# Patient Record
Sex: FEMALE | Race: WHITE | URBAN - METROPOLITAN AREA
[De-identification: names, ages, dates, MRNs, and addresses within clinical notes are randomized per-mention and may not be internally consistent; named-entity substitution may affect disease eponyms.]

---

## 2017-01-10 ENCOUNTER — TRANSFERRED RECORDS (OUTPATIENT)
Dept: HEALTH INFORMATION MANAGEMENT | Facility: CLINIC | Age: 62
End: 2017-01-10

## 2017-01-12 ENCOUNTER — OFFICE VISIT (OUTPATIENT)
Dept: OTOLARYNGOLOGY | Facility: CLINIC | Age: 62
End: 2017-01-12
Payer: COMMERCIAL

## 2017-01-12 ENCOUNTER — TELEPHONE (OUTPATIENT)
Dept: OTOLARYNGOLOGY | Facility: CLINIC | Age: 62
End: 2017-01-12

## 2017-01-12 DIAGNOSIS — M95.0 MOHS DEFECT OF NOSE: Primary | ICD-10-CM

## 2017-01-12 DIAGNOSIS — Z98.890 MOHS DEFECT OF NOSE: Primary | ICD-10-CM

## 2017-01-12 PROCEDURE — 99204 OFFICE O/P NEW MOD 45 MIN: CPT | Performed by: OTOLARYNGOLOGY

## 2017-01-12 RX ORDER — CALCIUM CARBONATE 500(1250)
500 TABLET ORAL 2 TIMES DAILY
COMMUNITY

## 2017-01-12 RX ORDER — CLINDAMYCIN HCL 300 MG
300 CAPSULE ORAL
Refills: 0 | Status: ON HOLD | COMMUNITY
Start: 2017-01-06 | End: 2017-01-25

## 2017-01-12 RX ORDER — SODIUM PHOSPHATE,MONO-DIBASIC 19G-7G/118
1 ENEMA (ML) RECTAL DAILY
COMMUNITY

## 2017-01-12 ASSESSMENT — PAIN SCALES - GENERAL: PAINLEVEL: NO PAIN (0)

## 2017-01-12 NOTE — PROGRESS NOTES
"Spenser Melendez MD  [facility]  [address], [address]  [place] [address]    Dear Doctor Al,    Thank you for asking me to see your patient, Ms. Reyna Escudero, in consultation to evaluate her right nasal defect after Mohs surgery.  Today I had the pleasure of seeing her at the Facial Plastic and Reconstructive Surgery Clinic in the Department of Otolaryngology at [facility].    CLINICAL SUMMARY:   Diagnoses:  Right nasal  defect from basal cell carcinoma, s/p Mohs surgery by Dr. Melendez.     Comorbidities: Joint replacements abx prophylactic, nausea after surgery  Pertinent medications: None  Photographs: [facility] consents signed January 12, 2017.   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   Care Checklist:   _Needs antibiotics prior to surgery because of joint replacement.  _Prescribe zofran postoperatively because of prior nausea after surgery.   _Schedule for stage 1 right nasal reconstruction with wound preparation, skin graft from either neck or postauricular skin.       MEDICAL DECISION MAKING:      Nasal defect after Mohs surgery. The reconstructive options of healing by secondary intention versus skin grafting versus local flap reconstruction were described in detail.  After carefully considering the options, she wishes to undergo skin grafting. We have initiated surgery scheduling. She did not find the aesthetic result from healing by secondary intention, nor the time needed to fully heal a wound secondarily to be acceptable. She also thought that the scars from a bilobe flap or forehead flap were too extensive. We have initiated surgery scheduling.    It has been a pleasure to participate in the care of Ms. Escudero.  Thank you for this kind referral.     Sincerely,    Robbie Velez MD    Division of Facial Plastic and Reconstructive Surgery,   Department of Otolaryngology  [facility] "   ____________________________________________________          HISTORY OF PRESENT ILLNESS and SKIN CANCER QUESTIONAAIRE:  As you know, Ms. Escudero is an61 year old-year-old female who presents with a facial defect after Mohs surgery.     Review of the Mohs or cancer removal documentation shows:   Date of Mohs surgery or skin cancer removal: 1/10/17.  Cancer type: basal cell carcinoma.  Margins: tumor free margins were obtained,  How would you rate your pain since your surgery? 1  Provider- How would you rate your pain since surgery? 1    Have you had any significant bleeding since your surgery? No  Provider- Have you had any significant bleeding since your surgery? No    Have you had any significant discharge since your surgery? No  Provider- Have you had any significant discharge since your surgery? No    Have you had any fevers since your surgery? No  Provider- Have you had any fevers since your surgery? No    No: Do you currently smoke?   Provider- Do you currently smoke? No    No: Have you had previous facial reconstruction?   Provider- Have you had previous facial reconstruction? No    What was at the cancer site before the removal? Pimple-like lesion, non-healing  How long had you noticed the lesion or growth prior to having the removal? 3 month(s)  Did that lesion grow? Yes. If yes: Moderate,  Have you had a lot of sun exposure in the past? Yes    Do you remember blistering sunburns anywhere on your body? No  Have you used a tanning bed in the past? No    Have you smoked in the past?Yes  Have you had radiation to your head or neck in the past? No  Have you had previous skin cancers? No    For a defect site near or on the nose:   No: Did you have troubles breathing through your nose before Mohs surgery or cancer removal?   Yes: Any problems breathing through your nose after Mohs surgery or cancer removal?    Provider- Any problems breathing through your nose after Mohs surgery or cancer removal?  It is not  really breathing problems, I am just aware of it. The Mohs surgery has not changed her breathing at all. She feels tenderness. Breathing is not restricted.         REVIEW OF SYSTEMS: a 12 system review was performed by the patient care staff:    Do you currently have or have you ever had in the past:    Yes - nausea: Complications with sedation or anesthesia.  No: Use blood thinners. No   No: Any heart problems.   No: Chest pain.   No: A pacemaker.  No: Problems with excessive bleeding or a bleeding disorder.  No: Problems with blood clots or a clotting disorder.   No: Sleep apnea or sleep with a CPAP machine.       No: Excessive scarring.   No: Night sweats.   No: Fevers.   No: Double vision.   No: Vision loss.   Yes: Snoring.   No: Difficulty breathing through your nose.   No: Runny nose.   No: Sneezing.   No: Itchy eyes.   No: Itchy throat.   No: Face pain.   No: Face weakness.   No: Face numbness.   No: Difficulty swallowing.   No: Pain with swallowing.,   No: Difficulty hearing or hearing loss.   No: Difficulty urinating.   No: Anxiety.   No: Depression.     FAMILY HISTORY:  No: Family history of excessive bleeding or a bleeding disorder.    No: Family history of blood clots or a clotting disorder.    Yes: Family history of skin cancer.    No family history on file.    PAST MEDICAL HISTORY:   Past Medical History   Diagnosis Date     Cancer (H)      BCC nasal 2017     Headache        PAST SURGICAL HISTORY:   Past Surgical History   Procedure Laterality Date     Orthopedic surgery       right shoulder reverse , right hip replacement       SOCIAL HISTORY:   Social History   Substance Use Topics     Smoking status: Former Smoker     Smokeless tobacco: Not on file     Alcohol Use: Not on file       ALLERGIES: Penicillins    MEDICATIONS:   Current Outpatient Prescriptions   Medication Sig Dispense Refill     TRAMADOL HCL PO        glucosamine-chondroitin 500-400 MG CAPS per capsule Take 1 capsule by mouth daily        BIOTIN PO        calcium carbonate (OS-DEANA 500 MG [place]. [place]) 500 MG tablet Take 500 mg by mouth 2 times daily       MULTIPLE VITAMIN PO        clindamycin (CLEOCIN) 300 MG capsule Take 300 mg by mouth   0       Defect size per Mohs record or operative report: 13mm x 15mm    Patient Care Staff Signature: Hortensia Reddy RN  ______________________________________________    Provider- Review of systems, FH, PMH, PSH, SH, ALL, and Medications taken by the patient care staff was reviewed by me: Robbie Velez      Provider- PHYSICAL EXAMINATION:  CONSTITUTIONAL:  No apparent distress.  Pleasant affect.  Normal ability to communicate.  CRANIOFACIAL:  Normocephalic, atraumatic.    SKIN:  [id number] defect.   Subunits involved: right nasal tip, sidewall and dorsum centered at the junction of these subunits. Reaches but does not cross midline.   Nearby landmarks: 6mm from nostril margin on the right with the right nostril slightly lower.   Depth: through the dermis.  Surrounding skin is viable. No bleeding.    EYES:  Extraocular muscles intact.  EARS:  Normal auricles.  Tympanic membranes clear bilaterally.  NOSE: No external nasal valve collapse.  Slight septal deviation.  No polyps or purulence.  ORAL CAVITY AND OROPHARYNX: no lesions on inspection.  NECK:  The parotid is soft, without masses.  Supple laryngeal landmarks.  LYMPHATIC:  No palpable lymphadenopathy.  CARDIOVASCULAR:  Carotid pulses are palpable bilaterally.  NEUROLOGIC:  Facial nerve intact.  RESPIRATORY:  Normal respiratory effort.  No stridor.  Voice strong.      Provider-: PREOPERATIVE COUNSELING: An extensive preoperative discussion was held. The patient stated she understood the risks, benefits, alternatives and limitations of the procedure. The risks including but not limited to bleeding, infection, damage to surrounding structures, numbness, weakness, cancer recurrence, chronic pain, poor aesthetic result, partial or total skin loss, distortion of  surrounding facial structures, and unforeseen complications related to surgery or anesthesia were described. I emphasized the risks of partial or total skin loss at the surgical sites. She also understands the possibility of distortion of surrounding facial structures including her nostril margin which may result in alar retraction or nasal congestion. I discussed the limitations of this procedure in that the donor site will have anticipated scars and complications can occur at the donor site.  She understands that more skin may need to be excised during the reconstruction. We discussed how additional surgeries may be needed to obtain an optimal result.    I described how no reconstructive effort will be able to restore her to her exact precancer condition. We also acknowledged that facial asymmetries will be present after the reconstruction. The patient stated she had her questions answered to her satisfaction.

## 2017-01-12 NOTE — PATIENT INSTRUCTIONS
Please contact our clinic if you have questions or if problems arise:    [place] office: [phone]  [facility] office: [phone]    If it is an urgent matter when the clinic is closed, please contact the [facility]  [phone] and ask to have Dr. Robbie Velez, or the ENT resident on-call paged. If it is a serious matter requiring immediate attention, please call 911 or go to your nearest emergency department.      Wound Care Instructions:     Please keep your facial wound covered at all times with ointment to keep the wound healthy. Dryness and crusting means the wound is unhealthy. You many need to apply ointment every 2 hours while you are awake to keep the wound constantly moist. If the wound is near your eyes, use Erythromycin Ophthalmic Ointment (ointment that is safe to get into the eyes). Otherwise, if it is away from your eyes, use Vaseline on the wound. Make sure the wound is always moist and covered with ointment.    You can choose to wear a dressing or bandage to camoflauge the wound, but a dressing or bandage is not necessary unless you work in a dirty or [place] environment. Covering the wound at all times with ointment to maintain moisture is necessary. If you wear a dressing or bandage, check under the dressing frequently to make sure the wound does not dry out.      You can shower and let the wound get wet in 48 hours. Do not scrub the wound. After your shower, gently pat the wound dry with a clean towel and apply more ointment.    Patients are often concerned about whether an open facial wound could get infected. It is very rare that an open wound gets a severe bacterial infection because bacteria can only sit on the surface of the wound and cannot penetrate into the deeper tissue leading to problems. Wounds commonly build up yellow or gray debris and appear infected even when they are not. This yellow or gray debris are waste products from the healing  process combined with ointment. The best way to stop even the appearance of an infection is to keep your wound constantly moist and let the shower gently remove this debris once a day.      A more common type of infection is a minor fungal infection that results from keeping the wound moist with ointment. This is like diaper rash around your wound. Patients know that they have a fungal infection when they start experiencing severe itching, and discomfort around the wound, and see discrete red patches away from the wound (called satellite lesions). If you suspect you have any type of infection, please call us.     Please contact our clinic if you have questions or if problems arise: [place] office: [phone]. If it is an urgent matter when the clinic is closed, please contact the [facility]  [phone] and ask to have Dr. Robbie Velez, or the ENT resident on-call paged. If it is a serious matter requiring immediate attention, please call 911 or go to your nearest emergency department.

## 2017-01-12 NOTE — NURSING NOTE
Reyna Escudero's goals for this visit include:   Chief Complaint   Patient presents with     Nose Problem     nasal mohs ref Al       She requests these members of her care team be copied on today's visit information: No primary care provider on file.      PCP: No primary care provider on file.    Referring Provider:  No referring provider defined for this encounter.    Chief Complaint   Patient presents with     Nose Problem     nasal mohs ref Al       Initial There were no vitals taken for this visit. There is no height or weight on file to calculate BMI.  BP completed using cuff size: NA (Not Taken)    Do you need any medication refills at today's visit? No    Hortensia Reddy RN

## 2017-01-13 NOTE — TELEPHONE ENCOUNTER
Procedure: right Stage 1 right Nasal  reconstruction with full thickness skin graft: from either neck or post auricular skin, complex closure and wound preperation  Facility: [facility], [facility] or [facility]  Length: 3.0 hour(s)  Surgeon:Robbie Velez Jr, MD  Anesthesia: Local with MAC  Diagnosis: Mohs Defect  Out Patient or AM admit:  (Same day)  BMI:There is no height or weight on file to calculate BMI. (If over 43 for general or 45 for MAC cannot be scheduled at [facility])   Pre-op Appointments needed: History & Physical within 30 days of surgery  Post-op appointments needed: Mohs Defect1 week   needed:No   Surgery packet/instructions given to patient?:  Yes  Pre op teaching done:  Yes  Lens Orders Needed: No   Referring provider:   Special Considerations:

## 2017-01-13 NOTE — TELEPHONE ENCOUNTER
Date Scheduled: 1-25-17  Facility: [facility]  Surgeon: Dr. Velez   Post-op appointment scheduled: 1 week   scheduled?: No  Surgery packet/instructions confirmed received?  Yes, Mailed  Special Considerations:

## 2017-01-17 ENCOUNTER — ANESTHESIA EVENT (OUTPATIENT)
Dept: SURGERY | Facility: AMBULATORY SURGERY CENTER | Age: 62
End: 2017-01-17
Payer: COMMERCIAL

## 2017-01-18 RX ORDER — OXYCODONE AND ACETAMINOPHEN 5; 325 MG/1; MG/1
1-2 TABLET ORAL
Status: ON HOLD | COMMUNITY
Start: 2016-12-13 | End: 2017-01-25

## 2017-01-24 NOTE — ANESTHESIA PREPROCEDURE EVALUATION
Physical Exam  Normal systems: cardiovascular, pulmonary and dental    Airway   Mallampati: III  TM distance: >3 FB  Neck ROM: full  Comment: Poor effort    Dental     Cardiovascular   Rhythm and rate: regular and normal      Pulmonary    breath sounds clear to auscultation                    Anesthesia Plan      History & Physical Review  History and physical reviewed and following examination; no interval change.    ASA Status:  2 .    NPO Status:  > 6 hours    Plan for MAC with Intravenous and Propofol induction. Maintenance will be TIVA.  Reason for MAC:  Procedure to face, neck, head or breast  PONV prophylaxis:  Ondansetron (or other 5HT-3) and Dexamethasone or Solumedrol  Patient 1g tylenol at 2am for headache. She received 975mg preoperatively as well. Will have to not administer any more tylenol in the facility and detailed instructions given stay below 4g in a given 24hr period and patient now aware that her percocet contain tylenol as well.       Postoperative Care  Postoperative pain management:  IV analgesics, Oral pain medications and Multi-modal analgesia.      Consents  Anesthetic plan, risks, benefits and alternatives discussed with:  Patient..            ANESTHESIA PREOP EVALUATION    PROCEDURE: Procedure(s):  stage 1 Right nasal reconstruction wound prep, full thickness skin graft and complex closure - Wound Class:     HPI: Reyna Escudero is a 61 year old female who presents for above procedure 2/2 mohs defect.    PAST MEDICAL HISTORY:    Past Medical History   Diagnosis Date     Cancer (H)      BCC nasal 2017     Headache      PONV (postoperative nausea and vomiting)      Other chronic pain        PAST SURGICAL HISTORY:    Past Surgical History   Procedure Laterality Date     Orthopedic surgery       right shoulder reverse , right hip replacement     As total hip arthroplasty Right 2014       PAST ANESTHESIA HISTORY:     No personal or family h/o anesthesia problems    SOCIAL HISTORY:        Social History   Substance Use Topics     Smoking status: Former Smoker     Smokeless tobacco: Not on file     Alcohol Use: Yes      Comment: occasional       ALLERGIES:     Allergies   Allergen Reactions     Penicillins Palpitations       MEDICATIONS:     No prescriptions prior to admission       Current Outpatient Prescriptions   Medication Sig Dispense Refill     oxyCODONE-acetaminophen (PERCOCET) 5-325 MG per tablet Take 1-2 tablets by mouth       clindamycin (CLEOCIN) 300 MG capsule Take 300 mg by mouth   0     TRAMADOL HCL PO        glucosamine-chondroitin 500-400 MG CAPS per capsule Take 1 capsule by mouth daily       BIOTIN PO        calcium carbonate (OS-DEANA 500 MG [place]. CA) 500 MG tablet Take 500 mg by mouth 2 times daily       MULTIPLE VITAMIN PO          No current Epic-ordered facility-administered medications on file.     Current Outpatient Prescriptions Ordered in [facility]   Medication Sig Dispense Refill     oxyCODONE-acetaminophen (PERCOCET) 5-325 MG per tablet Take 1-2 tablets by mouth       clindamycin (CLEOCIN) 300 MG capsule Take 300 mg by mouth   0     TRAMADOL HCL PO        glucosamine-chondroitin 500-400 MG CAPS per capsule Take 1 capsule by mouth daily       BIOTIN PO        calcium carbonate (OS-DEANA 500 MG [place]. CA) 500 MG tablet Take 500 mg by mouth 2 times daily       MULTIPLE VITAMIN PO          PHYSICAL EXAM:    Vitals: T Data Unavailable, P Data Unavailable, BP Data Unavailable, R Data Unavailable, SpO2  , Weight   Wt Readings from Last 2 Encounters:   01/18/17 77.111 kg (170 lb)       See doc flowsheet      No Data Recorded    No Data Recorded    No Data Recorded    No Data Recorded    No Data Recorded    No data recorded.  No data recorded.      NPO STATUS: see doc flowsheet    LABS:    BMP:  No results for input(s): NA, POTASSIUM, CHLORIDE, CO2, BUN, CR, GLC, DEANA in the last 64350 hours.    LFTs:   No results for input(s): PROTTOTAL, ALBUMIN, BILITOTAL, ALKPHOS, AST, ALT, BILIDIRECT  in the last 85149 hours.    CBC:   No results for input(s): WBC, RBC, HGB, HCT, MCV, MCH, MCHC, RDW, PLT in the last 13169 hours.    Coags:  No results for input(s): INR, PTT, FIBR in the last 34998 hours.    Imaging:  No orders to display       Todd Dela Cruz MD  Anesthesiology Staff  Pager [phone]    1/23/2017  11:06 PM                    .

## 2017-01-25 ENCOUNTER — ANESTHESIA (OUTPATIENT)
Dept: SURGERY | Facility: AMBULATORY SURGERY CENTER | Age: 62
End: 2017-01-25
Payer: COMMERCIAL

## 2017-01-25 ENCOUNTER — HOSPITAL ENCOUNTER (OUTPATIENT)
Facility: AMBULATORY SURGERY CENTER | Age: 62
Discharge: HOME | End: 2017-01-25
Attending: OTOLARYNGOLOGY | Admitting: OTOLARYNGOLOGY
Payer: COMMERCIAL

## 2017-01-25 ENCOUNTER — SURGERY (OUTPATIENT)
Age: 62
End: 2017-01-25
Payer: COMMERCIAL

## 2017-01-25 VITALS
TEMPERATURE: 97.7 F | BODY MASS INDEX: 27.32 KG/M2 | SYSTOLIC BLOOD PRESSURE: 130 MMHG | HEIGHT: 66 IN | DIASTOLIC BLOOD PRESSURE: 75 MMHG | RESPIRATION RATE: 18 BRPM | WEIGHT: 170 LBS | OXYGEN SATURATION: 97 %

## 2017-01-25 DIAGNOSIS — M95.0 MOHS DEFECT OF NOSE: Primary | ICD-10-CM

## 2017-01-25 DIAGNOSIS — Z98.890 MOHS DEFECT OF NOSE: Primary | ICD-10-CM

## 2017-01-25 PROCEDURE — 15260 FTH/GFT FR N/E/E/L 20 SQCM/<: CPT

## 2017-01-25 PROCEDURE — G8907 PT DOC NO EVENTS ON DISCHARG: HCPCS

## 2017-01-25 PROCEDURE — 88305 TISSUE EXAM BY PATHOLOGIST: CPT | Performed by: OTOLARYNGOLOGY

## 2017-01-25 PROCEDURE — 15004 WOUND PREP F/N/HF/G: CPT

## 2017-01-25 PROCEDURE — 15260 FTH/GFT FR N/E/E/L 20 SQCM/<: CPT | Performed by: OTOLARYNGOLOGY

## 2017-01-25 PROCEDURE — G8916 PT W IV AB GIVEN ON TIME: HCPCS

## 2017-01-25 PROCEDURE — 15004 WOUND PREP F/N/HF/G: CPT | Mod: 51 | Performed by: OTOLARYNGOLOGY

## 2017-01-25 PROCEDURE — 88342 IMHCHEM/IMCYTCHM 1ST ANTB: CPT | Performed by: OTOLARYNGOLOGY

## 2017-01-25 RX ORDER — HYDROMORPHONE HYDROCHLORIDE 1 MG/ML
.3-.5 INJECTION, SOLUTION INTRAMUSCULAR; INTRAVENOUS; SUBCUTANEOUS EVERY 10 MIN PRN
Status: DISCONTINUED | OUTPATIENT
Start: 2017-01-25 | End: 2017-01-25 | Stop reason: HOSPADM

## 2017-01-25 RX ORDER — FENTANYL CITRATE 50 UG/ML
25-50 INJECTION, SOLUTION INTRAMUSCULAR; INTRAVENOUS
Status: DISCONTINUED | OUTPATIENT
Start: 2017-01-25 | End: 2017-01-25 | Stop reason: HOSPADM

## 2017-01-25 RX ORDER — OXYCODONE HYDROCHLORIDE 5 MG/1
5 TABLET ORAL EVERY 4 HOURS PRN
Qty: 30 TABLET | Refills: 0 | Status: SHIPPED | OUTPATIENT
Start: 2017-01-25 | End: 2017-11-02

## 2017-01-25 RX ORDER — SODIUM CHLORIDE, SODIUM LACTATE, POTASSIUM CHLORIDE, CALCIUM CHLORIDE 600; 310; 30; 20 MG/100ML; MG/100ML; MG/100ML; MG/100ML
INJECTION, SOLUTION INTRAVENOUS CONTINUOUS
Status: DISCONTINUED | OUTPATIENT
Start: 2017-01-25 | End: 2017-01-25 | Stop reason: HOSPADM

## 2017-01-25 RX ORDER — LIDOCAINE 40 MG/G
CREAM TOPICAL
Status: DISCONTINUED | OUTPATIENT
Start: 2017-01-25 | End: 2017-01-25 | Stop reason: HOSPADM

## 2017-01-25 RX ORDER — FENTANYL CITRATE 50 UG/ML
INJECTION, SOLUTION INTRAMUSCULAR; INTRAVENOUS PRN
Status: DISCONTINUED | OUTPATIENT
Start: 2017-01-25 | End: 2017-01-25

## 2017-01-25 RX ORDER — MEPERIDINE HYDROCHLORIDE 25 MG/ML
12.5 INJECTION INTRAMUSCULAR; INTRAVENOUS; SUBCUTANEOUS
Status: DISCONTINUED | OUTPATIENT
Start: 2017-01-25 | End: 2017-01-25 | Stop reason: HOSPADM

## 2017-01-25 RX ORDER — ONDANSETRON 2 MG/ML
INJECTION INTRAMUSCULAR; INTRAVENOUS PRN
Status: DISCONTINUED | OUTPATIENT
Start: 2017-01-25 | End: 2017-01-25

## 2017-01-25 RX ORDER — ONDANSETRON 4 MG/1
4 TABLET, FILM COATED ORAL EVERY 6 HOURS PRN
Qty: 18 TABLET | Refills: 1 | Status: SHIPPED | OUTPATIENT
Start: 2017-01-25

## 2017-01-25 RX ORDER — DEXAMETHASONE SODIUM PHOSPHATE 4 MG/ML
INJECTION, SOLUTION INTRA-ARTICULAR; INTRALESIONAL; INTRAMUSCULAR; INTRAVENOUS; SOFT TISSUE PRN
Status: DISCONTINUED | OUTPATIENT
Start: 2017-01-25 | End: 2017-01-25

## 2017-01-25 RX ORDER — ACETAMINOPHEN 325 MG/1
975 TABLET ORAL ONCE
Status: COMPLETED | OUTPATIENT
Start: 2017-01-25 | End: 2017-01-25

## 2017-01-25 RX ORDER — ONDANSETRON 4 MG/1
4 TABLET, ORALLY DISINTEGRATING ORAL EVERY 30 MIN PRN
Status: DISCONTINUED | OUTPATIENT
Start: 2017-01-25 | End: 2017-01-25 | Stop reason: HOSPADM

## 2017-01-25 RX ORDER — CLINDAMYCIN PHOSPHATE 900 MG/50ML
900 INJECTION, SOLUTION INTRAVENOUS SEE ADMIN INSTRUCTIONS
Status: DISCONTINUED | OUTPATIENT
Start: 2017-01-25 | End: 2017-01-25 | Stop reason: HOSPADM

## 2017-01-25 RX ORDER — DEXAMETHASONE SODIUM PHOSPHATE 10 MG/ML
10 INJECTION, SOLUTION INTRAMUSCULAR; INTRAVENOUS ONCE
Status: DISCONTINUED | OUTPATIENT
Start: 2017-01-25 | End: 2017-01-25 | Stop reason: HOSPADM

## 2017-01-25 RX ORDER — CLINDAMYCIN PHOSPHATE 900 MG/50ML
900 INJECTION, SOLUTION INTRAVENOUS
Status: DISCONTINUED | OUTPATIENT
Start: 2017-01-25 | End: 2017-01-25 | Stop reason: HOSPADM

## 2017-01-25 RX ORDER — LIDOCAINE HYDROCHLORIDE 20 MG/ML
INJECTION, SOLUTION INFILTRATION; PERINEURAL PRN
Status: DISCONTINUED | OUTPATIENT
Start: 2017-01-25 | End: 2017-01-25

## 2017-01-25 RX ORDER — LEVOFLOXACIN 500 MG/1
500 TABLET, FILM COATED ORAL DAILY
Qty: 7 TABLET | Refills: 0 | Status: SHIPPED | OUTPATIENT
Start: 2017-01-25 | End: 2017-02-01

## 2017-01-25 RX ORDER — OXYCODONE HYDROCHLORIDE 5 MG/1
5-10 TABLET ORAL ONCE
Status: COMPLETED | OUTPATIENT
Start: 2017-01-25 | End: 2017-01-25

## 2017-01-25 RX ORDER — MUPIROCIN 20 MG/G
OINTMENT TOPICAL
Qty: 22 G | Refills: 1 | Status: SHIPPED | OUTPATIENT
Start: 2017-01-25 | End: 2017-11-02

## 2017-01-25 RX ORDER — PROPOFOL 10 MG/ML
INJECTION, EMULSION INTRAVENOUS CONTINUOUS PRN
Status: DISCONTINUED | OUTPATIENT
Start: 2017-01-25 | End: 2017-01-25

## 2017-01-25 RX ORDER — MUPIROCIN 20 MG/G
OINTMENT TOPICAL PRN
Status: SHIPPED | OUTPATIENT
Start: 2017-01-25

## 2017-01-25 RX ORDER — SODIUM CHLORIDE, SODIUM LACTATE, POTASSIUM CHLORIDE, CALCIUM CHLORIDE 600; 310; 30; 20 MG/100ML; MG/100ML; MG/100ML; MG/100ML
INJECTION, SOLUTION INTRAVENOUS CONTINUOUS PRN
Status: DISCONTINUED | OUTPATIENT
Start: 2017-01-25 | End: 2017-01-25

## 2017-01-25 RX ORDER — LIDOCAINE HYDROCHLORIDE AND EPINEPHRINE 10; 10 MG/ML; UG/ML
INJECTION, SOLUTION INFILTRATION; PERINEURAL PRN
Status: DISCONTINUED | OUTPATIENT
Start: 2017-01-25 | End: 2017-01-25 | Stop reason: HOSPADM

## 2017-01-25 RX ORDER — NALOXONE HYDROCHLORIDE 0.4 MG/ML
.1-.4 INJECTION, SOLUTION INTRAMUSCULAR; INTRAVENOUS; SUBCUTANEOUS
Status: DISCONTINUED | OUTPATIENT
Start: 2017-01-25 | End: 2017-01-25 | Stop reason: HOSPADM

## 2017-01-25 RX ORDER — GABAPENTIN 300 MG/1
300 CAPSULE ORAL ONCE
Status: COMPLETED | OUTPATIENT
Start: 2017-01-25 | End: 2017-01-25

## 2017-01-25 RX ORDER — ONDANSETRON 2 MG/ML
4 INJECTION INTRAMUSCULAR; INTRAVENOUS EVERY 30 MIN PRN
Status: DISCONTINUED | OUTPATIENT
Start: 2017-01-25 | End: 2017-01-25 | Stop reason: HOSPADM

## 2017-01-25 RX ADMIN — GABAPENTIN 300 MG: 300 CAPSULE ORAL at 06:43

## 2017-01-25 RX ADMIN — LIDOCAINE HYDROCHLORIDE 60 MG: 20 INJECTION, SOLUTION INFILTRATION; PERINEURAL at 07:35

## 2017-01-25 RX ADMIN — SODIUM CHLORIDE, SODIUM LACTATE, POTASSIUM CHLORIDE, CALCIUM CHLORIDE: 600; 310; 30; 20 INJECTION, SOLUTION INTRAVENOUS at 07:18

## 2017-01-25 RX ADMIN — DEXAMETHASONE SODIUM PHOSPHATE 4 MG: 4 INJECTION, SOLUTION INTRA-ARTICULAR; INTRALESIONAL; INTRAMUSCULAR; INTRAVENOUS; SOFT TISSUE at 07:39

## 2017-01-25 RX ADMIN — Medication 1 MEQ: at 07:44

## 2017-01-25 RX ADMIN — LIDOCAINE HYDROCHLORIDE AND EPINEPHRINE 7 ML: 10; 10 INJECTION, SOLUTION INFILTRATION; PERINEURAL at 07:44

## 2017-01-25 RX ADMIN — Medication 20 DROP: at 07:52

## 2017-01-25 RX ADMIN — ACETAMINOPHEN 975 MG: 325 TABLET ORAL at 06:43

## 2017-01-25 RX ADMIN — CLINDAMYCIN PHOSPHATE 900 MG: 900 INJECTION, SOLUTION INTRAVENOUS at 07:26

## 2017-01-25 RX ADMIN — ONDANSETRON 4 MG: 2 INJECTION INTRAMUSCULAR; INTRAVENOUS at 09:00

## 2017-01-25 RX ADMIN — SODIUM CHLORIDE, SODIUM LACTATE, POTASSIUM CHLORIDE, CALCIUM CHLORIDE: 600; 310; 30; 20 INJECTION, SOLUTION INTRAVENOUS at 06:44

## 2017-01-25 RX ADMIN — MUPIROCIN 1 EACH: 20 OINTMENT TOPICAL at 09:25

## 2017-01-25 RX ADMIN — PROPOFOL 75 MCG/KG/MIN: 10 INJECTION, EMULSION INTRAVENOUS at 07:36

## 2017-01-25 RX ADMIN — FENTANYL CITRATE 50 MCG: 50 INJECTION, SOLUTION INTRAMUSCULAR; INTRAVENOUS at 07:30

## 2017-01-25 RX ADMIN — FENTANYL CITRATE 50 MCG: 50 INJECTION, SOLUTION INTRAMUSCULAR; INTRAVENOUS at 07:05

## 2017-01-25 RX ADMIN — OXYCODONE HYDROCHLORIDE 10 MG: 5 TABLET ORAL at 09:44

## 2017-01-25 NOTE — ANESTHESIA CARE TRANSFER NOTE
Patient: Reyna Escudero    REPAIR MOHS (Right Nose)  Additional InformationProcedure(s):  stage 1 Right nasal reconstruction wound prep, full thickness post auricular skin graft and complex closure - Wound Class: I-Clean    Diagnosis: mohs  Diagnosis Additional Information: No value filed.    Anesthesia Type:   MAC     Note:  Airway :Room Air  Patient transferred to:Phase II  Comments: vss report to rn, no problems, comfortable.      Vitals: (Last set prior to Anesthesia Care Transfer)              Electronically Signed By: BRAYAN Onofre CRNA  January 25, 2017  9:33 AM

## 2017-01-25 NOTE — ANESTHESIA POSTPROCEDURE EVALUATION
Patient: Reyna Escudero    REPAIR MOHS (Right Nose)  Additional InformationProcedure(s):  stage 1 Right nasal reconstruction wound prep, full thickness post auricular skin graft and complex closure - Wound Class: I-Clean    Diagnosis:mohs  Diagnosis Additional Information: No value filed.    Anesthesia Type:  MAC    Note:  Anesthesia Post Evaluation    Patient location during evaluation: Phase 2  Patient participation: Able to fully participate in evaluation  Level of consciousness: awake and alert  Pain management: adequate  Airway patency: patent  Cardiovascular status: acceptable  Respiratory status: acceptable  Hydration status: acceptable  PONV: none     Anesthetic complications: None          Last vitals:  Filed Vitals:    01/25/17 0930 01/25/17 0945 01/25/17 1000   BP: 119/63 131/77 130/75   Temp: 97.7  F (36.5  C)     Resp: 16 18 18   SpO2: 95% 97% 97%       Electronically Signed By: Todd Dela Cruz MD  January 25, 2017  10:22 AM

## 2017-01-25 NOTE — DISCHARGE INSTRUCTIONS
"Please review Dr. Velez's Discharge Packet \"Patient Instructions for Facial Surgery.\"      Follow up with Dr. Velez in about 1 week at [facility]. Call [phone] to make an appointment.    Please contact Dr. Velez directly on his cell phone [phone] if you have questions or concerns.    [facility]  Same-Day Surgery   Adult Discharge Orders & Instructions   For 24 hours after surgery  1. Get plenty of rest.  A responsible adult must stay with you for at least 24 hours after you leave the hospital.   2. Do not drive or use heavy equipment.  If you have weakness or tingling, don't drive or use heavy equipment until this feeling goes away.  3. Do not drink alcohol.  4. Avoid strenuous or risky activities.  Ask for help when climbing stairs.   5. You may feel lightheaded.  IF so, sit for a few minutes before standing.  Have someone help you get up.   6. If you have nausea (feel sick to your stomach): Drink only clear liquids such as apple juice, ginger ale, broth or 7-Up.  Rest may also help.  Be sure to drink enough fluids.  Move to a regular diet as you feel able.  7. You may have a slight fever. Call the doctor if your fever is over 100 F (37.7 C) (taken under the tongue) or lasts longer than 24 hours.  8. You may have a dry mouth, a sore throat, muscle aches or trouble sleeping.  These should go away after 24 hours.  9. Do not make important or legal decisions.   Call your doctor for any of the followin.  Signs of infection (fever, growing tenderness at the surgery site, a large amount of drainage or bleeding, severe pain, foul-smelling drainage, redness, swelling).    2. It has been over 8 to 10 hours since surgery and you are still not able to urinate (pass water).    3.  Headache for over 24 hours.    4.  Numbness, tingling or weakness the day after surgery (if you had spinal anesthesia).   To contact Dr. Velez call his cell phone at [phone]    "

## 2017-01-25 NOTE — IP AVS SNAPSHOT
[facility]    [address] [place] [address]    Phone:  [phone]                                       After Visit Summary   1/25/2017    Reyna Escudero    MRN: [id number]           After Visit Summary Signature Page     I have received my discharge instructions, and my questions have been answered. I have discussed any challenges I see with this plan with the nurse or doctor.    ..........................................................................................................................................  Patient/Patient Representative Signature      ..........................................................................................................................................  Patient Representative Print Name and Relationship to Patient    ..................................................               ................................................  Date                                            Time    ..........................................................................................................................................  Reviewed by Signature/Title    ...................................................              ..............................................  Date                                                            Time

## 2017-01-25 NOTE — BRIEF OP NOTE
[facility] Brief Operative Note    Pre-operative diagnosis: Right nasal wound   Post-operative diagnosis: Same   Procedure: Stage 1 right nasal reconstruction with wound preparation, FTSG from right postauricular skin   Surgeon: Robbie Velez   Assistant(s): None   Anesthesia: Conscious sedation   Estimated blood loss: Minimal   Total IV fluids: (See anesthesia record)   Blood transfusion: No transfusion was given during surgery   Total urine output: (See anesthesia record)   Drains: None   Specimens: To path   Implants: None   Findings: See dictated operative report for full details   Complications: None.   Condition: Stable.   Comments: See dictated operative report for full details

## 2017-01-25 NOTE — IP AVS SNAPSHOT
"                  MRN:[id number]                      After Visit Summary   1/25/2017    Reyna Escudero    MRN: [id number]           Thank you!     Thank you for choosing [facility] for your care. Our goal is always to provide you with excellent care. Hearing back from our patients is one way we can continue to improve our services. Please take a few minutes to complete the written survey that you may receive in the mail after you visit with us. Thank you!        Patient Information     Date Of Birth          1955        About your hospital stay     You were admitted on:  January 25, 2017 [place]  [facility]    You were discharged on:  January 25, 2017       Who to Call     For medical emergencies, please call 911.  For non-urgent questions about your medical care, please call your primary care provider or clinic, None  For questions related to your surgery, please call your surgery clinic        Attending Provider     Provider    Robbie Velez MD       Primary Care Provider    [facility]       No address on file        Further instructions from your care team       Please review Dr. Velez's Discharge Packet \"Patient Instructions for Facial Surgery.\"      Follow up with Dr. Velez in about 1 week at [facility]. Call [phone] to make an appointment.    Please contact Dr. Velez directly on his cell phone [phone] if you have questions or concerns.    [facility]  Same-Day Surgery   Adult Discharge Orders & Instructions   For 24 hours after surgery  1. Get plenty of rest.  A responsible adult must stay with you for at least 24 hours after you leave the hospital.   2. Do not drive or use heavy equipment.  If you have weakness or tingling, don't drive or use heavy equipment until this feeling goes away.  3. Do not drink alcohol.  4. Avoid strenuous or risky activities.  Ask for help when " "climbing stairs.   5. You may feel lightheaded.  IF so, sit for a few minutes before standing.  Have someone help you get up.   6. If you have nausea (feel sick to your stomach): Drink only clear liquids such as apple juice, ginger ale, broth or 7-Up.  Rest may also help.  Be sure to drink enough fluids.  Move to a regular diet as you feel able.  7. You may have a slight fever. Call the doctor if your fever is over 100 F (37.7 C) (taken under the tongue) or lasts longer than 24 hours.  8. You may have a dry mouth, a sore throat, muscle aches or trouble sleeping.  These should go away after 24 hours.  9. Do not make important or legal decisions.   Call your doctor for any of the followin.  Signs of infection (fever, growing tenderness at the surgery site, a large amount of drainage or bleeding, severe pain, foul-smelling drainage, redness, swelling).    2. It has been over 8 to 10 hours since surgery and you are still not able to urinate (pass water).    3.  Headache for over 24 hours.    4.  Numbness, tingling or weakness the day after surgery (if you had spinal anesthesia).   To contact Dr. Velez call his cell phone at [phone]      Pending Results     No orders found from 2017 to 2017.            Admission Information        Provider Department Dept Phone    2017 Robbie Velez MD Mg Preop/Phase [phone]      Your Vitals Were     Blood Pressure Temperature Respirations    119/63 mmHg 97.7  F (36.5  C) (Temporal) 16    Height Weight BMI (Body Mass Index)    1.676 m (5' 6\") 77.111 kg (170 lb) 27.45 kg/m2    Pulse Oximetry          95%        MyChart Information     [employer] lets you send messages to your doctor, view your test results, renew your prescriptions, schedule appointments and more. To sign up, go to [email or web address] . Click on \"Log in\" on the left side of the screen, which will take you to the Welcome page. Then click on \"Sign up Now\" on the right side of the page. "     You will be asked to enter the access code listed below, as well as some personal information. Please follow the directions to create your username and password.     Your access code is: [email or web address]  Expires: 2017  9:22 AM     Your access code will  in 90 days. If you need help or a new code, please call your [facility] clinic or [phone].        Care EveryWhere ID     This is your Care EveryWhere ID. This could be used by other organizations to access your [facility] medical records  [id number]           Review of your medicines      START taking        Dose / Directions    levofloxacin 500 MG tablet   Commonly known as:  LEVAQUIN   Used for:  Mohs defect of nose        Dose:  500 mg   Take 1 tablet (500 mg) by mouth daily for 7 days   Quantity:  7 tablet   Refills:  0       mupirocin 2 % ointment   Commonly known as:  BACTROBAN   Used for:  Mohs defect of nose        Apply topically every 2 hours (while awake) Follow instructions for ointment use on your discharge instructions from Dr. Velez   Quantity:  22 g   Refills:  1       ondansetron 4 MG tablet   Commonly known as:  ZOFRAN   Used for:  Mohs defect of nose        Dose:  4 mg   Take 1 tablet (4 mg) by mouth every 6 hours as needed for nausea   Quantity:  18 tablet   Refills:  1       oxyCODONE 5 MG IR tablet   Commonly known as:  ROXICODONE   Used for:  Mohs defect of nose        Dose:  5 mg   Take 1 tablet (5 mg) by mouth every 4 hours as needed for moderate to severe pain   Quantity:  30 tablet   Refills:  0         CONTINUE these medicines which have NOT CHANGED        Dose / Directions    BIOTIN PO        Refills:  0       calcium carbonate 500 MG tablet   Commonly known as:  OS-DEANA 500 mg [place]. [place]        Dose:  500 mg   Take 500 mg by mouth 2 times daily   Refills:  0       glucosamine-chondroitin 500-400 MG Caps per capsule        Dose:  1 capsule   Take 1 capsule by mouth daily   Refills:  0       MULTIPLE VITAMIN PO        Refills:   0       TRAMADOL HCL PO        Refills:  0         STOP taking     oxyCODONE-acetaminophen 5-325 MG per tablet   Commonly known as:  PERCOCET                Where to get your medicines      These medications were sent to [facility] [place] - [place], [place] - [address], [address]  [address], [address], [facility] [address]     Phone:  [phone]    - ondansetron 4 MG tablet      Some of these will need a paper prescription and others can be bought over the counter. Ask your nurse if you have questions.     Bring a paper prescription for each of these medications    - levofloxacin 500 MG tablet  - mupirocin 2 % ointment  - oxyCODONE 5 MG IR tablet             Protect others around you: Learn how to safely use, store and throw away your medicines at www.disposemymeds.org.             Medication List: This is a list of all your medications and when to take them. Check marks below indicate your daily home schedule. Keep this list as a reference.      Medications           Morning Afternoon Evening Bedtime As Needed    BIOTIN PO                                calcium carbonate 500 MG tablet   Commonly known as:  OS-DEANA 500 mg [place]. [place]   Take 500 mg by mouth 2 times daily                                glucosamine-chondroitin 500-400 MG Caps per capsule   Take 1 capsule by mouth daily                                levofloxacin 500 MG tablet   Commonly known as:  LEVAQUIN   Take 1 tablet (500 mg) by mouth daily for 7 days                                MULTIPLE VITAMIN PO                                mupirocin 2 % ointment   Commonly known as:  BACTROBAN   Apply topically every 2 hours (while awake) Follow instructions for ointment use on your discharge instructions from Dr. Velez                                ondansetron 4 MG tablet   Commonly known as:  ZOFRAN   Take 1 tablet (4 mg) by mouth every 6 hours as needed for nausea                                oxyCODONE 5 MG IR tablet    Commonly known as:  ROXICODONE   Take 1 tablet (5 mg) by mouth every 4 hours as needed for moderate to severe pain   Last time this was given:  10 mg on 1/25/2017  9:44 AM                                TRAMADOL HCL PO

## 2017-01-25 NOTE — OP NOTE
DATE: 1/25/17  ATTENDING SURGEON: Robbie Velez MD     PREOPERATIVE DIAGNOSES:   1. Right nasal defect 14x14 mm.     POSTOPERATIVE DIAGNOSES:   1. Right nasal defect 14x14 mm.     PROCEDURE:  1. Right nasal wound preparation for reconstruction 19x16.  2. Full thickness skin graft harvested from the right postauricular skin and transferred to the right nasal wound.    OPERATIVE FINDINGS: Right nasal defect with viable surrounding skin and granulation tissue at the base of the defect.  At the end of the procedure the bolster was secure. Complete hemostasis was obtained. No evidence of hematoma.    INDICATIONS: Ms. Reyna Escudero is a 61 year old-year-old female who recently underwent excision of a cutaneous malignancy using the Mohs micrographic technique. Tumor-free margins were obtained. He was referred for reconstruction of this defect. An extensive preoperative discussion was held. The patient stated she understood the risks, benefits, alternatives and limitations of the procedure. Ms. Escudero also stated she had her questions answered to her satisfaction. She wished to proceed with surgery.     DESCRIPTION OF PROCEDURE: The patient was shown the additional excision in the mirror and she approved the markings. After informed consent was obtained and placed in the chart, the patient was brought to operating room, placed in a supine position. After successful induction of conscious sedation, the patient was prepped and draped in the standard sterile fashion, which included injection of 1% lidocaine with 1:100,000 epinephrine at the proposed operative sites. The right postauricular skin was chosen as the donor site because it had no hair-bearing skin and had a relatively close color match to her recipient site skin color and tone with adequate skin laxity for primary closure.   A timeout was performed. The correct patient and procedure were verified. It was also confirmed that she was wearing functional pneumo  boots, antibiotics were given, her pressure points were adequately protected, and her eyes were prepared with ophthalmic preparation, and protected with wet Ray-Sparkle gauze throughout the procedure.   Procedure began with preparation of the right nasal wound for reconstruction. A 15 blade scalpel was used to perform circumferential scar release around the periphery of the wound. Additional subunit skin was also excised and sent for routine pathology. Excess granulation tissue was excised from the base of the defect. This resulted in a vascularized base and periphery of the wound, which was ready for reconstruction.   A template was then made of the wound. It was transferred to the right postauricular donor site. A fusiform design was marked around the template oriented along the relaxed skin tension lines of this region. A 15 blade scalpel was used to incise around the periphery of the fusiform marking. The skin graft was elevated in the subcutaneous plane with a 15 blade scalpel. This skin graft was placed in saline on the back bench. Attention then turned towards closure of the donor site. Complete hemostasis was obtained with bipolar cautery. The incision was then closed in multiple layers using multiple interrupted 3-0 Vicryl sutures in the deepest layer, multiple interrupted 4-0 Vicryl sutures in the dermal layer, and dermabond and steristrips for the superficial layer. At the end of this portion of the procedure, there was no evidence of hematoma. Complete hemostasis was obtained. All tissue was viable.   The fatty tissue on the deep aspect of the graft was thinned until viable dermis was observed on the deep aspect. This was transferred to the right nasal defect. It was trimmed to the appropriate size and inset around the periphery using multiple interrupted and half mattress 6-0 chromic sutures. A bolster was then applied using Xeroform gauze and 5-0 nylon tie-over sutures. The bolster was secure. Complete  hemostasis was obtained. There is no evidence of hematoma. The patient was returned to the care of the anesthesia service in stable condition.   Postoperative written and verbal instructions were given in detail. She will follow up next week for bolster removal. She has my direct number to call if questions or problems arise.   CAMERON WILSON MD

## 2017-01-25 NOTE — PROGRESS NOTES
"CLINICAL SUMMARY:   Diagnoses:  Right nasal  defect from basal cell carcinoma, s/p Mohs surgery by Dr. Melendez.   -1/25/17: stage 1 skin graft, donor = postauricular skin (path = pending).     Comorbidities: Joint replacements abx prophylactic, nausea after surgery  Pertinent medications: None  Photographs:  consents signed January 12, 2017.   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 Dermabond postauricular closure, half mattress anterior, superior, and posterior skin graft margins.    Care Checklist:    _Needs antibiotics prior to surgery because of joint replacement.  _Prescribe zofran postoperatively because of prior nausea after surgery.    _Path from 1/25/17.  "

## 2017-01-30 LAB — COPATH REPORT: NORMAL

## 2017-02-02 ENCOUNTER — OFFICE VISIT (OUTPATIENT)
Dept: OTOLARYNGOLOGY | Facility: CLINIC | Age: 62
End: 2017-02-02
Payer: COMMERCIAL

## 2017-02-02 DIAGNOSIS — M95.0 MOHS DEFECT OF NOSE: Primary | ICD-10-CM

## 2017-02-02 DIAGNOSIS — Z98.890 MOHS DEFECT OF NOSE: Primary | ICD-10-CM

## 2017-02-02 PROCEDURE — 99024 POSTOP FOLLOW-UP VISIT: CPT | Performed by: OTOLARYNGOLOGY

## 2017-02-02 ASSESSMENT — PAIN SCALES - GENERAL: PAINLEVEL: NO PAIN (0)

## 2017-02-02 NOTE — PATIENT INSTRUCTIONS
Please contact our clinic if you have questions or if problems arise:    [place] office: [phone]  [facility] office: [phone]    If it is an urgent matter when the clinic is closed, please contact the [facility]  [phone] and ask to have Dr. Robbie Velez, or the ENT resident on-call paged. If it is a serious matter requiring immediate attention, please call 911 or go to your nearest emergency department.

## 2017-02-02 NOTE — PROGRESS NOTES
"CLINICAL SUMMARY:   Diagnoses:  Right nasal  defect from basal cell carcinoma, s/p Mohs surgery by Dr. Melendez.    -1/25/17: stage 1 skin graft, donor = postauricular skin (path = benign skin).     Comorbidities: Joint replacements abx prophylactic, nausea after surgery  Pertinent medications: None  Photographs: [facility] consents signed January 12, 2017.   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 Dermabond postauricular closure, half mattress anterior, superior, and posterior skin graft margins.    Care Checklist:    _Needs antibiotics prior to surgery because of joint replacement.  _Prescribe zofran postoperatively because of prior nausea after surgery.    _RTC 1 week with the nursing staff for removal of any remaining sutures. Then follow up with Dr. Velez in 3-4 weeks.       Facial Plastic and Reconstructive Surgery Note    Today I had the pleasure of seeing the patient at the Facial Plastic and Reconstructive Surgery Clinic in the Department of Otolaryngology at [facility]. The patient underwent skin grafting last week. She has been doing well. Providing local wound care as recommended. No pain, bleeding or discharge from the wound.    On examination, the patient is in no apparent distress, no stridor, voice is strong.   Bolster was removed today.  The skin graft appears viable throughout the grafted area.     Auricular donor site: No evidence of hematoma or infection. All tissue is viable.  Pathology from the excess skin excised during the reconstruction showed no evidence of malignancy.    Impression and Recommendations:  status post stage 1 skin graft reconstruction last week. She is recovering well and the reconstruction appears viable. I cautioned the patient to avoid any trauma to the skin graft including showering. Continue with careful wound care to maintain wound moisture and promote healing. We discussed the importance of sun protection, especially at the reconstruction site. Follow " up in 1 week with our nursing staff. I encouraged her to call or return sooner if questions or problems arise. Robbie Velez

## 2017-02-02 NOTE — MR AVS SNAPSHOT
After Visit Summary   2/2/2017    Reyna Escudero    MRN: [id number]           Patient Information     Date Of Birth          1955        Visit Information        Provider Department      2/2/2017 1:00 PM Robbie Velez MD [facility]        Today's Diagnoses     Mohs defect of nose    -  1       Care Instructions    Please contact our clinic if you have questions or if problems arise:    [place] office: [phone]  [facility] office: [phone]    If it is an urgent matter when the clinic is closed, please contact the [facility]  [phone] and ask to have Dr. Robbie Velez, or the ENT resident on-call paged. If it is a serious matter requiring immediate attention, please call 911 or go to your nearest emergency department.          Follow-ups after your visit        Follow-up notes from your care team     Return in about 1 week (around 2/9/2017).      Your next 10 appointments already scheduled     Feb 08, 2017 11:00 AM   Nurse Only with PROC RM 1 BEE   [facility])    [address] [place] [id number] [phone]            Mar 02, 2017 12:00 PM   Return Visit with Robbie Velez MD   [facility])    [address] [place] [id number] [phone]              Who to contact     If you have questions or need follow up information about today's clinic visit or your schedule please contact [facility] directly at [phone].  Normal or non-critical lab and imaging results will be communicated to you by MyChart, letter or phone within 4 business days after the clinic has received the results. If you do not hear from us within 7 days, please contact the clinic through MyChart or phone. If you have a critical or abnormal lab result, we will notify you by phone as soon as possible.  Submit  refill requests through [employer] or call your pharmacy and they will forward the refill request to us. Please allow 3 business days for your refill to be completed.          Additional Information About Your Visit        [employer] Information     [employer] is an electronic gateway that provides easy, online access to your medical records. With [employer], you can request a clinic appointment, read your test results, renew a prescription or communicate with your care team.     To sign up for [employer] visit the website at [email or web address]   You will be asked to enter the access code listed below, as well as some personal information. Please follow the directions to create your username and password.     Your access code is: [email or web address]  Expires: 2017  9:22 AM     Your access code will  in 90 days. If you need help or a new code, please contact your [facility] or call [phone] for assistance.        Care EveryWhere ID     This is your Care EveryWhere ID. This could be used by other organizations to access your [facility] medical records  [id number]         Blood Pressure from Last 3 Encounters:   17 130/75    Weight from Last 3 Encounters:   17 77.111 kg (170 lb)              Today, you had the following     No orders found for display       Primary Care Provider    [facility]       No address on file        Thank you!     Thank you for choosing [facility]  for your care. Our goal is always to provide you with excellent care. Hearing back from our patients is one way we can continue to improve our services. Please take a few minutes to complete the written survey that you may receive in the mail after your visit with us. Thank you!             Your Updated Medication List - Protect others around you: Learn how to safely use, store and throw away your medicines at www.disposemymeds.org.          This list is accurate  as of: 2/2/17  1:56 PM.  Always use your most recent med list.                   Brand Name Dispense Instructions for use    BIOTIN PO          calcium carbonate 500 MG tablet    OS-DEANA 500 mg [place]. [place]     Take 500 mg by mouth 2 times daily       glucosamine-chondroitin 500-400 MG Caps per capsule      Take 1 capsule by mouth daily       MULTIPLE VITAMIN PO          mupirocin 2 % ointment    BACTROBAN    22 g    Apply topically every 2 hours (while awake) Follow instructions for ointment use on your discharge instructions from Dr. Velez       ondansetron 4 MG tablet    ZOFRAN    18 tablet    Take 1 tablet (4 mg) by mouth every 6 hours as needed for nausea       oxyCODONE 5 MG IR tablet    ROXICODONE    30 tablet    Take 1 tablet (5 mg) by mouth every 4 hours as needed for moderate to severe pain       TRAMADOL HCL PO

## 2017-02-02 NOTE — NURSING NOTE
"Reyna Escudero's goals for this visit include:   Chief Complaint   Patient presents with     Nose Problem     1 week postop skin graft       She requests these members of her care team be copied on today's visit information: [facility]      PCP: [facility]    Referring Provider:  No referring provider defined for this encounter.    Chief Complaint   Patient presents with     Nose Problem     1 week postop skin graft       Initial There were no vitals taken for this visit. Estimated body mass index is 27.45 kg/(m^2) as calculated from the following:    Height as of 1/18/17: 1.676 m (5' 6\").    Weight as of 1/25/17: 77.111 kg (170 lb).  BP completed using cuff size: NA (Not Taken)    Do you need any medication refills at today's visit? No    Hortensia Reddy RN    "

## 2017-02-08 ENCOUNTER — ALLIED HEALTH/NURSE VISIT (OUTPATIENT)
Dept: NURSING | Facility: CLINIC | Age: 62
End: 2017-02-08
Payer: COMMERCIAL

## 2017-02-08 DIAGNOSIS — Z98.890 MOHS DEFECT OF NOSE: Primary | ICD-10-CM

## 2017-02-08 DIAGNOSIS — M95.0 MOHS DEFECT OF NOSE: Primary | ICD-10-CM

## 2017-02-08 PROCEDURE — 99207 ZZC NO CHARGE NURSE ONLY: CPT

## 2017-02-08 NOTE — PROGRESS NOTES
Reyna Escudero comes into clinic today at the request of Dr Robbie Velez for suture removal and wound check, nasal tip skin graft.    Wound appears well covered in vaseline, no lifting of graft or bleeding noted.  Sutures removed without difficulty and vaseline reapplied to site.    This service provided today was under the direct supervision of Dr Pierre, who was available if needed.    Hortensia Reddy RN

## 2017-02-22 ENCOUNTER — OFFICE VISIT (OUTPATIENT)
Dept: OTOLARYNGOLOGY | Facility: CLINIC | Age: 62
End: 2017-02-22
Payer: COMMERCIAL

## 2017-02-22 DIAGNOSIS — Z98.890 MOHS DEFECT OF NOSE: Primary | ICD-10-CM

## 2017-02-22 DIAGNOSIS — M95.0 MOHS DEFECT OF NOSE: Primary | ICD-10-CM

## 2017-02-22 PROCEDURE — 99024 POSTOP FOLLOW-UP VISIT: CPT | Performed by: OTOLARYNGOLOGY

## 2017-02-22 ASSESSMENT — PAIN SCALES - GENERAL: PAINLEVEL: NO PAIN (0)

## 2017-02-22 NOTE — MR AVS SNAPSHOT
After Visit Summary   2/22/2017    Reyna Escudero    MRN: [id number]           Patient Information     Date Of Birth          1955        Visit Information        Provider Department      2/22/2017 1:00 PM Robbie Velez MD [facility]        Today's Diagnoses     Mohs defect of nose    -  1      Care Instructions    Please contact our clinic if you have questions or if problems arise:    [place] office: [phone]  [facility] office: [phone]    If it is an urgent matter when the clinic is closed, please contact the [facility]  [phone] and ask to have Dr. Robbie Velez, or the ENT resident on-call paged. If it is a serious matter requiring immediate attention, please call 911 or go to your nearest emergency department.          Follow-ups after your visit        Follow-up notes from your care team     Return in about 6 weeks (around 4/5/2017).      Your next 10 appointments already scheduled     Apr 13, 2017 12:00 PM CDT   Return Visit with Robbie Velez MD   [facility] ([facility])    [address] [place] [id number]   [phone]              Who to contact     If you have questions or need follow up information about today's clinic visit or your schedule please contact [facility] directly at [phone].  Normal or non-critical lab and imaging results will be communicated to you by MyChart, letter or phone within 4 business days after the clinic has received the results. If you do not hear from us within 7 days, please contact the clinic through MyChart or phone. If you have a critical or abnormal lab result, we will notify you by phone as soon as possible.  Submit refill requests through [employer]k or call your pharmacy and they will forward the refill request to us. Please allow 3 business days for your refill to be completed.          Additional  Information About Your Visit        NormOxys Information     NormOxys is an electronic gateway that provides easy, online access to your medical records. With NormOxys, you can request a clinic appointment, read your test results, renew a prescription or communicate with your care team.     To sign up for NormOxys visit the website at [email or web address]   You will be asked to enter the access code listed below, as well as some personal information. Please follow the directions to create your username and password.     Your access code is: [email or web address]  Expires: 2017  9:22 AM     Your access code will  in 90 days. If you need help or a new code, please contact your [facility] or call [phone] for assistance.        Care EveryWhere ID     This is your Care EveryWhere ID. This could be used by other organizations to access your [facility] medical records  [id number]         Blood Pressure from Last 3 Encounters:   17 130/75    Weight from Last 3 Encounters:   17 77.1 kg (170 lb)              Today, you had the following     No orders found for display       Primary Care Provider    [facility]       No address on file        Thank you!     Thank you for choosing [facility]  for your care. Our goal is always to provide you with excellent care. Hearing back from our patients is one way we can continue to improve our services. Please take a few minutes to complete the written survey that you may receive in the mail after your visit with us. Thank you!             Your Updated Medication List - Protect others around you: Learn how to safely use, store and throw away your medicines at www.disposemymeds.org.          This list is accurate as of: 17  1:21 PM.  Always use your most recent med list.                   Brand Name Dispense Instructions for use    BIOTIN PO          calcium carbonate 500 MG tablet    OS-DEANA  500 mg [place]. Ca     Take 500 mg by mouth 2 times daily       glucosamine-chondroitin 500-400 MG Caps per capsule      Take 1 capsule by mouth daily       MULTIPLE VITAMIN PO          mupirocin 2 % ointment    BACTROBAN    22 g    Apply topically every 2 hours (while awake) Follow instructions for ointment use on your discharge instructions from Dr. Velez       ondansetron 4 MG tablet    ZOFRAN    18 tablet    Take 1 tablet (4 mg) by mouth every 6 hours as needed for nausea       oxyCODONE 5 MG IR tablet    ROXICODONE    30 tablet    Take 1 tablet (5 mg) by mouth every 4 hours as needed for moderate to severe pain       TRAMADOL HCL PO

## 2017-02-22 NOTE — NURSING NOTE
"Reyna Escudero's goals for this visit include:   Chief Complaint   Patient presents with     Nose Problem     4 week nasal graft check       She requests these members of her care team be copied on today's visit information: [facility]      PCP: [facility]    Referring Provider:  No referring provider defined for this encounter.    Chief Complaint   Patient presents with     Nose Problem     4 week nasal graft check       Initial There were no vitals taken for this visit. Estimated body mass index is 27.44 kg/(m^2) as calculated from the following:    Height as of 1/18/17: 1.676 m (5' 6\").    Weight as of 1/18/17: 77.1 kg (170 lb).  Medication Reconciliation: complete    Do you need any medication refills at today's visit? No    Hortensia Reddy RN    "

## 2017-02-22 NOTE — PROGRESS NOTES
Care Checklist:    _Needs antibiotics prior to surgery because of joint replacement.  _Prescribe zofran postoperatively because of prior nausea after surgery.    _RTC 1 week with the nursing staff for removal of any remaining sutures. Then follow up with Dr. Velez in 3-4 weeks.

## 2017-02-22 NOTE — PROGRESS NOTES
"CLINICAL SUMMARY:   Diagnoses:  Right nasal  defect from basal cell carcinoma, s/p Mohs surgery by Dr. Melendez.    -1/25/17: stage 1 skin graft, donor = postauricular skin (path = benign skin).      Comorbidities: Joint replacements abx prophylactic, nausea after surgery  Pertinent medications: None  Photographs:  consents signed January 12, 2017.   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 Dermabond postauricular closure, half mattress anterior, superior, and posterior skin graft margins.    Care Checklist:    _Needs antibiotics prior to surgery because of joint replacement.  _Prescribe zofran postoperatively because of prior nausea after surgery.    _RTC 6 weeks.    Facial Plastic and Reconstructive Surgery Note    Today I had the pleasure of seeing Ms. Escudero at the Facial Plastic and Reconstructive Surgery Clinic in the Department of Otolaryngology at [facility]. The patient recently underwent skin grafting. She has been doing well and providing local wound care as recommended. No pain, bleeding or discharge from the wound. Pain 0/10.    On examination, the patient is in no apparent distress, no stridor, voice is strong.   The skin graft appears viable throughout the grafted area.   Donor site appears healthy. Remainder of the sutures were removed. Incisions are clean, dry, and intact. No evidence of hematoma or infection.      Impression and Recommendations:  Status post skin graft reconstruction over 3 weeks ago. She is recovering well and the reconstruction appears viable. I cautioned the patient to avoid any trauma to the skin graft. Continue with careful wound care to maintain wound moisture and promote healing. The patient may shower the graft but must avoid scrubbing. We discussed the importance of sun protection, especially at the reconstruction site. Follow up in 6-8 weeks. I encouraged her to call or return sooner if she has questions or if problems " arise. Robbie Velez

## 2017-04-19 ENCOUNTER — OFFICE VISIT (OUTPATIENT)
Dept: OTOLARYNGOLOGY | Facility: CLINIC | Age: 62
End: 2017-04-19
Payer: COMMERCIAL

## 2017-04-19 DIAGNOSIS — Z98.890 MOHS DEFECT OF NOSE: Primary | ICD-10-CM

## 2017-04-19 DIAGNOSIS — M95.0 MOHS DEFECT OF NOSE: Primary | ICD-10-CM

## 2017-04-19 PROCEDURE — 99024 POSTOP FOLLOW-UP VISIT: CPT | Performed by: OTOLARYNGOLOGY

## 2017-04-19 ASSESSMENT — PAIN SCALES - GENERAL: PAINLEVEL: NO PAIN (0)

## 2017-04-19 NOTE — NURSING NOTE
"Reyna Escudero's goals for this visit include:   Chief Complaint   Patient presents with     Nose Problem     recheck nasal repair       She requests these members of her care team be copied on today's visit information: [facility]      PCP: [facility]    Referring Provider:  No referring provider defined for this encounter.    Chief Complaint   Patient presents with     Nose Problem     recheck nasal repair       Initial There were no vitals taken for this visit. Estimated body mass index is 27.44 kg/(m^2) as calculated from the following:    Height as of 1/18/17: 1.676 m (5' 6\").    Weight as of 1/18/17: 77.1 kg (170 lb).  Medication Reconciliation: complete    Do you need any medication refills at today's visit? No    Hortensia Reddy RN    "

## 2017-04-19 NOTE — PATIENT INSTRUCTIONS
Please contact our clinic if you have questions or if problems arise:    [place] office: [phone]  [facility] office: [phone]    If it is an urgent matter when the clinic is closed, please contact the [facility]  [phone] and ask to have Dr. Robbie Velez, or the ENT resident on-call paged. If it is a serious matter requiring immediate attention, please call 911 or go to your nearest emergency department.

## 2017-04-19 NOTE — MR AVS SNAPSHOT
After Visit Summary   4/19/2017    Reyna Escudero    MRN: [id number]           Patient Information     Date Of Birth          1955        Visit Information        Provider Department      4/19/2017 2:30 PM Robbie Velez MD [facility]        Today's Diagnoses     Mohs defect of nose    -  1      Care Instructions    Please contact our clinic if you have questions or if problems arise:    [place] office: [phone]  [facility] office: [phone]    If it is an urgent matter when the clinic is closed, please contact the [facility]  [phone] and ask to have Dr. Robbie Velez, or the ENT resident on-call paged. If it is a serious matter requiring immediate attention, please call 911 or go to your nearest emergency department.          Follow-ups after your visit        Follow-up notes from your care team     Return in about 6 months (around 10/19/2017).      Who to contact     If you have questions or need follow up information about today's clinic visit or your schedule please contact [facility] directly at [phone].  Normal or non-critical lab and imaging results will be communicated to you by MyChart, letter or phone within 4 business days after the clinic has received the results. If you do not hear from us within 7 days, please contact the clinic through MyChart or phone. If you have a critical or abnormal lab result, we will notify you by phone as soon as possible.  Submit refill requests through [employer] or call your pharmacy and they will forward the refill request to us. Please allow 3 business days for your refill to be completed.          Additional Information About Your Visit        Care EveryWhere ID     This is your Care EveryWhere ID. This could be used by other organizations to access your [facility] medical records  [id number]         Blood Pressure from Last 3 Encounters:   01/25/17 130/75     Weight from Last 3 Encounters:   01/18/17 77.1 kg (170 lb)              Today, you had the following     No orders found for display       Primary Care Provider    [facility]       No address on file        Thank you!     Thank you for choosing [facility]  for your care. Our goal is always to provide you with excellent care. Hearing back from our patients is one way we can continue to improve our services. Please take a few minutes to complete the written survey that you may receive in the mail after your visit with us. Thank you!             Your Updated Medication List - Protect others around you: Learn how to safely use, store and throw away your medicines at www.disposemymeds.org.          This list is accurate as of: 4/19/17  2:59 PM.  Always use your most recent med list.                   Brand Name Dispense Instructions for use    BIOTIN PO          calcium carbonate 500 MG tablet    OS-DEANA 500 mg [place]. Ca     Take 500 mg by mouth 2 times daily       glucosamine-chondroitin 500-400 MG Caps per capsule      Take 1 capsule by mouth daily       MULTIPLE VITAMIN PO          mupirocin 2 % ointment    BACTROBAN    22 g    Apply topically every 2 hours (while awake) Follow instructions for ointment use on your discharge instructions from Dr. Velez       ondansetron 4 MG tablet    ZOFRAN    18 tablet    Take 1 tablet (4 mg) by mouth every 6 hours as needed for nausea       oxyCODONE 5 MG IR tablet    ROXICODONE    30 tablet    Take 1 tablet (5 mg) by mouth every 4 hours as needed for moderate to severe pain       TRAMADOL HCL PO

## 2017-04-19 NOTE — PROGRESS NOTES
"CLINICAL SUMMARY:   Diagnoses:  Right nasal  defect from basal cell carcinoma, s/p Mohs surgery by Dr. Melendez.    -1/25/17: stage 1 skin graft, donor = postauricular skin (path = benign skin).       Comorbidities: Joint replacements abx prophylactic, nausea after surgery  Pertinent medications: None  Photographs:  consents signed January 12, 2017.   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 Dermabond postauricular closure, half mattress anterior, superior, and posterior skin graft margins.    Care Checklist:    _Needs antibiotics prior to surgery because of joint replacement.  _Prescribe zofran postoperatively because of prior nausea after surgery.    _RTC 6 months.      Facial Plastic and Reconstructive Surgery Note    Today I had the pleasure of seeing Ms. Escudero at the Facial Plastic and Reconstructive Surgery Clinic in the Department of Otolaryngology at [facility]. She underwent reconstruction several months ago. No pain, bleeding or discharge from the wound. She is very satisfied with the results of the reconstruction.    On examination, the patient is in no apparent distress, no stridor, voice is strong.   All tissue is viable with adequate color and capillary refill. Incisions are well healed. No evidence of infection. Donor site: all tissue is viable. No evidence of infection.  Nostril margin has no significant change. No evidence of external nasal valve collapse.    IMPRESSION AND RECOMMENDATIONS: Nasal defect after Mohs surgery. She underwent skin graft reconstruction over 6 weeks ago. The patient has recovered well and both she and I are satisfied with the result. We discussed the importance of sun protection, especially at the reconstruction site. I recommend periodic full body dermatologic examinations for cancer surveillance. She can follow up with me in 6 months. I encouraged her to call or return at any time if she has questions or if I can be of " service. It has been a pleasure to see Ms. Escudero today, I look forward to seeing her at our next visit.    Robbie Velez MD    Division of Facial Plastic and Reconstructive Surgery,   Department of Otolaryngology  [facility]

## 2017-06-14 ENCOUNTER — ALLIED HEALTH/NURSE VISIT (OUTPATIENT)
Dept: NURSING | Facility: CLINIC | Age: 62
End: 2017-06-14
Payer: COMMERCIAL

## 2017-06-14 DIAGNOSIS — Z98.890 MOHS DEFECT OF NOSE: Primary | ICD-10-CM

## 2017-06-14 DIAGNOSIS — M95.0 MOHS DEFECT OF NOSE: Primary | ICD-10-CM

## 2017-06-14 PROCEDURE — 99207 ZZC NO CHARGE NURSE ONLY: CPT

## 2017-06-14 NOTE — PROGRESS NOTES
Pt here for wound check, possible stitch abscess.  Wound appears well healed, with one small pimple-like lesion on the tip area of suture line.  Lesion opened, no drainage noted.  Vaseline reapplied to site.  Asked pt to monitor wound and to call back clinic if not healing or if other questions or concerns arise. Pt verbalized agreement with plan.  Hortensia Reddy RN

## 2017-11-02 ENCOUNTER — OFFICE VISIT (OUTPATIENT)
Dept: OTOLARYNGOLOGY | Facility: CLINIC | Age: 62
End: 2017-11-02
Payer: COMMERCIAL

## 2017-11-02 DIAGNOSIS — Z98.890 MOHS DEFECT OF NOSE: Primary | ICD-10-CM

## 2017-11-02 DIAGNOSIS — M95.0 MOHS DEFECT OF NOSE: Primary | ICD-10-CM

## 2017-11-02 PROCEDURE — 99213 OFFICE O/P EST LOW 20 MIN: CPT | Performed by: OTOLARYNGOLOGY

## 2017-11-02 ASSESSMENT — PAIN SCALES - GENERAL: PAINLEVEL: NO PAIN (0)

## 2017-11-02 NOTE — MR AVS SNAPSHOT
After Visit Summary   2017    Reyna Escudero    MRN: [id number]           Patient Information     Date Of Birth          1955        Visit Information        Provider Department      2017 3:30 PM Robbie Velez MD [facility]        Today's Diagnoses     Mohs defect of nose    -  1       Follow-ups after your visit        Follow-up notes from your care team     Return if symptoms worsen or fail to improve.      Who to contact     If you have questions or need follow up information about today's clinic visit or your schedule please contact [facility] directly at [phone].  Normal or non-critical lab and imaging results will be communicated to you by MyChart, letter or phone within 4 business days after the clinic has received the results. If you do not hear from us within 7 days, please contact the clinic through MyChart or phone. If you have a critical or abnormal lab result, we will notify you by phone as soon as possible.  Submit refill requests through [employer] or call your pharmacy and they will forward the refill request to us. Please allow 3 business days for your refill to be completed.          Additional Information About Your Visit        MyChart Information     SimpleDeal is an electronic gateway that provides easy, online access to your medical records. With [employer]De[employer], you can request a clinic appointment, read your test results, renew a prescription or communicate with your care team.     To sign up for [employer] visit the website at [email or web address]   You will be asked to enter the access code listed below, as well as some personal information. Please follow the directions to create your username and password.     Your access code is: [email or web address]  Expires: 2018  3:36 PM     Your access code will  in 90 days. If you need help or a new code, please contact your [facility] or call  [phone] for assistance.        Care EveryWhere ID     This is your Care EveryWhere ID. This could be used by other organizations to access your [facility] medical records  [id number]         Blood Pressure from Last 3 Encounters:   01/25/17 130/75    Weight from Last 3 Encounters:   01/18/17 77.1 kg (170 lb)              Today, you had the following     No orders found for display       Primary Care Provider Office Phone # Fax #    Saira STEWARD Jeff [phone]       [address] [address]        Equal Access to Services     [facility]: Hadii aad ku hadasho Soomaali, waaxda luqadaha, qaybta kaalmada noel, asim joseph . So [facility] [phone].    ATENCIÓN: Si habla español, tiene a sandoval disposición servicios gratuitos de asistencia lingüística. [facility] [phone].    We comply with applicable federal civil rights laws and [place] laws. We do not discriminate on the basis of race, color, national origin, age, disability, sex, sexual orientation, or gender identity.            Thank you!     Thank you for choosing [facility]  for your care. Our goal is always to provide you with excellent care. Hearing back from our patients is one way we can continue to improve our services. Please take a few minutes to complete the written survey that you may receive in the mail after your visit with us. Thank you!             Your Updated Medication List - Protect others around you: Learn how to safely use, store and throw away your medicines at www.disposemymeds.org.          This list is accurate as of: 11/2/17  3:36 PM.  Always use your most recent med list.                   Brand Name Dispense Instructions for use Diagnosis    BIOTIN PO           calcium carbonate 1250 MG tablet    OS-DEANA 500 mg [employer] [place]. Ca     Take 500 mg by mouth 2 times daily        glucosamine-chondroitin 500-400 MG Caps per capsule      Take 1 capsule  by mouth daily        MULTIPLE VITAMIN PO           ondansetron 4 MG tablet    ZOFRAN    18 tablet    Take 1 tablet (4 mg) by mouth every 6 hours as needed for nausea    Mohs defect of nose       TRAMADOL HCL PO

## 2017-11-02 NOTE — LETTER
"11/2/2017       RE: Reyna Escudero  [address] [address]     Dear Colleague,    Thank you for referring your patient, Reyna Escudero, to the [facility] at [facility]. Please see a copy of my visit note below.    CLINICAL SUMMARY:   Diagnoses:  Right nasal  defect from basal cell carcinoma, s/p Mohs surgery by Dr. Melendez.    -1/25/17: stage 1 skin graft, donor = postauricular skin (path = benign skin).       Comorbidities: Joint replacements abx prophylactic, nausea after surgery  Pertinent medications: None  Photographs: [facility] consents signed January 12, 2017.   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 Dermabond postauricular closure, half mattress anterior, superior, and posterior skin graft margins.    Care Checklist:    _If we ever do surgery in the future, needs antibiotics prior to surgery because of joint replacement and prescribe zofran postoperatively because of prior nausea after surgery.    _Skin surveillance with dermatologist.   _Sun protection.       Facial Plastic and Reconstructive Surgery Note    Today I had the pleasure of seeing Ms. Escudero at the Facial Plastic and Reconstructive Surgery Clinic in the Department of Otolaryngology at [facility]. She underwent reconstruction several months ago. No pain, bleeding or discharge from the wound. She is very satisfied with the results of the reconstruction.    What do you think of your result? \"I am really pleased with how it turned out. I was a little nervous about it. I feel comfortable with my repair at this point.\"     On examination, the patient is in no apparent distress, no stridor, voice is strong.   All grafted tissue is 100% viable with adequate color and capillary refill. Borders are well healed.  Donor site: all tissue is viable and well healed.  Nostril margin has no significant change. No evidence of external nasal valve " collapse.      IMPRESSION AND RECOMMENDATIONS: Nasal defect after Mohs surgery. She underwent reconstruction over 9 months ago. The patient has recovered well and both she and I are satisfied with the result. We discussed the importance of sun protection, especially at the reconstruction site. I recommend periodic full body dermatologic examinations for cancer surveillance. She can follow up with me on an as needed basis. I encouraged her to call or return at any time if she has questions or if I can be of service. It has been a pleasure to participate in the care of Ms. Escudero.    Robbie Velez MD    Division of Facial Plastic and Reconstructive Surgery,   Department of Otolaryngology  [facility]     Again, thank you for allowing me to participate in the care of your patient.      Sincerely,    Robbie Velez MD

## 2017-11-02 NOTE — PROGRESS NOTES
"CLINICAL SUMMARY:   Diagnoses:  Right nasal  defect from basal cell carcinoma, s/p Mohs surgery by Dr. Melendez.    -1/25/17: stage 1 skin graft, donor = postauricular skin (path = benign skin).       Comorbidities: Joint replacements abx prophylactic, nausea after surgery  Pertinent medications: None  Photographs:  consents signed January 12, 2017.   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 Dermabond postauricular closure, half mattress anterior, superior, and posterior skin graft margins.    Care Checklist:    _If we ever do surgery in the future, needs antibiotics prior to surgery because of joint replacement and prescribe zofran postoperatively because of prior nausea after surgery.    _Skin surveillance with dermatologist.   _Sun protection.       Facial Plastic and Reconstructive Surgery Note    Today I had the pleasure of seeing Ms. Escudero at the Facial Plastic and Re[facility]ive Surgery Clinic in the Department of Otolaryngology at [facility]. She underwent reconstruction several months ago. No pain, bleeding or discharge from the wound. She is very satisfied with the results of the reconstruction.    What do you think of your result? \"I am really pleased with how it turned out. I was a little nervous about it. I feel comfortable with my repair at this point.\"     On examination, the patient is in no apparent distress, no stridor, voice is strong.   All grafted tissue is 100% viable with adequate color and capillary refill. Borders are well healed.  Donor site: all tissue is viable and well healed.  Nostril margin has no significant change. No evidence of external nasal valve collapse.      IMPRESSION AND RECOMMENDATIONS: Nasal defect after Mohs surgery. She underwent reconstruction over 9 months ago. The patient has recovered well and both she and I are satisfied with the result. We discussed the importance of sun protection, especially at the " reconstruction site. I recommend periodic full body dermatologic examinations for cancer surveillance. She can follow up with me on an as needed basis. I encouraged her to call or return at any time if she has questions or if I can be of service. It has been a pleasure to participate in the care of Ms. Escudero.    Robbie Velez MD    Division of Facial Plastic and Reconstructive Surgery,   Department of Otolaryngology  [facility]

## 2017-11-02 NOTE — NURSING NOTE
"Reyna Escudero's goals for this visit include:   Chief Complaint   Patient presents with     RECHECK       She requests these members of her care team be copied on today's visit information: [facility]      PCP: [facility]    Referring Provider:  No referring provider defined for this encounter.    Chief Complaint   Patient presents with     RECHECK       Initial There were no vitals taken for this visit. Estimated body mass index is 27.44 kg/(m^2) as calculated from the following:    Height as of 1/18/17: 1.676 m (5' 6\").    Weight as of 1/18/17: 77.1 kg (170 lb).  Medication Reconciliation: complete    Do you need any medication refills at today's visit? No    Rosalina Ewing LPN      "

## (undated) DEVICE — NDL 30GA 1" 305128

## (undated) DEVICE — ESU GROUND PAD ADULT W/CORD E7507

## (undated) DEVICE — DRSG TELFA 3X8" 1238

## (undated) DEVICE — BLADE KNIFE SURG 15 371115

## (undated) DEVICE — PACK MINOR SBA15MIFSE

## (undated) DEVICE — GLOVE PROTEXIS W/NEU-THERA 7.5  2D73TE75

## (undated) DEVICE — DRSG XEROFORM 1X8"

## (undated) DEVICE — GLOVE PROTEXIS W/NEU-THERA 7.0  2D73TE70

## (undated) DEVICE — DRSG STERI STRIP 1/2X4" R1547

## (undated) DEVICE — SYR 10ML FINGER CONTROL W/O NDL 309695

## (undated) DEVICE — DRAPE SPLIT EENT 76X124" 3X28" 9447